# Patient Record
(demographics unavailable — no encounter records)

---

## 2024-12-06 NOTE — HISTORY OF PRESENT ILLNESS
[FreeTextEntry1] : 36 yo male, hx of overweight ( BMI 28), referred for elevated lfts, alkphos. NO family hx of liver disease Previous sonogram of abdomen without gallstones or biliary dilation. ALT 55, alp 155. Pt states drinks ETOH on weekends- usually 4-5 beers. WEight has been stable, denies supplement usage.  Immune to HBV, non exposed to HAV or HCV Ferritin 598  Fib 4 score : 0.54, suggests no significant hepatic fibrosis lipid panel acceptable, noted to have previous elevated TG.

## 2024-12-06 NOTE — ASSESSMENT
[FreeTextEntry1] : 36 yo male, hx of overweight ( BMI 28), referred for elevated lfts, alkphos. NO family hx of liver disease Previous sonogram of abdomen without gallstones or biliary dilation. ALT 55, alp 155. Pt states drinks ETOH on weekends- usually 4-5 beers. WEight has been stable, denies supplement usage.  Immune to HBV, non exposed to HAV or HCV Ferritin 598  Fib 4 score : 0.54, suggests no significant hepatic fibrosis lipid panel acceptable, noted to have previous elevated TG.  IMP: 1. mixed hepatocellular/cholestatic pattern to elevated LFTS; likely hepatic steatosis; fib 4 score less than 1.3 suggests no significant fibrosis 2. Previous elevated TG 3. Overweight  PLAN: 1. sonogram at [facility], pt request 2. HFE genetic test 3. M2 antibody anti mitochondrial ab 4. Iron studies 5. Celiac panel 6. repeat CMP 7. VILLA fibrosure 8 . weight loss advised 5-10 % Body weight 9. RTO 3months

## 2025-01-31 NOTE — PHYSICAL EXAM
[No Acute Distress] : no acute distress [Normal Sclera/Conjunctiva] : normal sclera/conjunctiva [Normal Outer Ear/Nose] : the outer ears and nose were normal in appearance [No Edema] : there was no peripheral edema [Normal] : soft, non-tender, non-distended, no masses palpated, no HSM and normal bowel sounds [No CVA Tenderness] : no CVA  tenderness [Coordination Grossly Intact] : coordination grossly intact [No Focal Deficits] : no focal deficits [Alert and Oriented x3] : oriented to person, place, and time

## 2025-01-31 NOTE — HISTORY OF PRESENT ILLNESS
[No Pertinent Cardiac History] : no history of aortic stenosis, atrial fibrillation, coronary artery disease, recent myocardial infarction, or implantable device/pacemaker [No Pertinent Pulmonary History] : no history of asthma, COPD, sleep apnea, or smoking [No Adverse Anesthesia Reaction] : no adverse anesthesia reaction in self or family member [Chronic Anticoagulation] : no chronic anticoagulation [Chronic Kidney Disease] : no chronic kidney disease [Diabetes] : no diabetes [FreeTextEntry1] : LEFT LKNEE ARTHROSCOPY [FreeTextEntry2] : 2/12/25 [FreeTextEntry3] : DR LEWIS  [FreeTextEntry4] : PERISTENT LEFT KNEE SX S/P PT

## 2025-02-04 NOTE — ASSESSMENT
[FreeTextEntry1] : 34 yo male, hx of overweight ( BMI 28), referred for elevated lfts, alkphos. NO family hx of liver disease Previous sonogram of abdomen without gallstones or biliary dilation. ALT 55, alp 155. Pt states drinks ETOH on weekends- usually 4-5 beers. WEight has been stable, denies supplement usage.  Immune to HBV, non exposed to HAV or HCV Ferritin 598  Fib 4 score : 0.54, suggests no significant hepatic fibrosis lipid panel acceptable, noted to have previous elevated TG.  2/5/24- elevated alp; drinks etoh weekends. Ferritin elevaed, normal judd fibrosure. Fib-4 less than 1.3, predicts no advanced fibrosis. Pending left knee meniscal repair. Reviewed previous sonogram without biliary dilation. IMP: 1. mixed hepatocellular/cholestatic pattern to elevated LFTS; likely hepatic steatosis; fib 4 score less than 1.3 suggests no significant fibrosis 2. Previous elevated TG 3. Overweight  PLAN: 1.cmp, gamma gt, iron studies, lipid 2. I spoke with the patient at length regarding fatty liver disease (Hepatic Steatosis). I have reviewed the spectrum of disease (NAFDL and nonalcoholic steatohepatitis or JUDD)  as well as the risk of disease progression to  cirrhosis and its complications. I have also explained that patients with fatty liver disease may develop liver cancer without cirrhosis and therefore should be screened yearly with an abdominal ultrasound. I have explained that fatty liver disease is commonly seen in patients with diabetes or those who are overweight. I have explained that fatty liver disease may also be a precursor to the development of diabetes as it is part of the metabolic syndrome. We reviewed the treatment of fatty liver disease and explained that the best therapy is diet and exercise. The diet should be a "healthy heart diet, " low in fatty foods or the Mediterranean diet. Alcohol should  be avoided. Furthermore, I explained that weight loss, 5-10% of body weight, may lead to improvement in the underlying liver disease state. 3. RTO 3months 4. if remains elevated, will consider MRCP

## 2025-02-04 NOTE — PHYSICAL EXAM

## 2025-02-04 NOTE — HISTORY OF PRESENT ILLNESS
[FreeTextEntry1] : 34 yo male, hx of overweight ( BMI 28), referred for elevated lfts, alkphos. NO family hx of liver disease Previous sonogram of abdomen without gallstones or biliary dilation. ALT 55, alp 155. Pt states drinks ETOH on weekends- usually 4-5 beers. WEight has been stable, denies supplement usage.  Immune to HBV, non exposed to HAV or HCV Ferritin 598  Fib 4 score : 0.54, suggests no significant hepatic fibrosis lipid panel acceptable, noted to have previous elevated TG.  2/5/24- elevated alp; drinks etoh weekends. Ferritin elevaed, normal judd fibrosure. Fib-4 less than 1.3, predicts no advanced fibrosis. Pending left knee meniscal repair. Reviewed previous sonogram without biliary dilation.